# Patient Record
Sex: FEMALE | Race: WHITE | NOT HISPANIC OR LATINO
[De-identification: names, ages, dates, MRNs, and addresses within clinical notes are randomized per-mention and may not be internally consistent; named-entity substitution may affect disease eponyms.]

---

## 2017-03-10 ENCOUNTER — RX RENEWAL (OUTPATIENT)
Age: 67
End: 2017-03-10

## 2017-03-15 ENCOUNTER — RX RENEWAL (OUTPATIENT)
Age: 67
End: 2017-03-15

## 2017-03-22 ENCOUNTER — OUTPATIENT (OUTPATIENT)
Dept: OUTPATIENT SERVICES | Facility: HOSPITAL | Age: 67
LOS: 1 days | Discharge: HOME | End: 2017-03-22

## 2017-03-22 DIAGNOSIS — C50.919 MALIGNANT NEOPLASM OF UNSPECIFIED SITE OF UNSPECIFIED FEMALE BREAST: ICD-10-CM

## 2017-03-23 ENCOUNTER — APPOINTMENT (OUTPATIENT)
Dept: HEMATOLOGY ONCOLOGY | Facility: CLINIC | Age: 67
End: 2017-03-23

## 2017-03-23 VITALS
WEIGHT: 130.07 LBS | BODY MASS INDEX: 27.18 KG/M2 | HEART RATE: 68 BPM | TEMPERATURE: 97.9 F | DIASTOLIC BLOOD PRESSURE: 76 MMHG | SYSTOLIC BLOOD PRESSURE: 120 MMHG | RESPIRATION RATE: 16 BRPM

## 2017-03-23 RX ORDER — B-COMPLEX WITH VITAMIN C
TABLET ORAL
Refills: 0 | Status: ACTIVE | COMMUNITY
Start: 2017-03-23

## 2017-08-08 ENCOUNTER — EMERGENCY (EMERGENCY)
Facility: HOSPITAL | Age: 67
LOS: 0 days | Discharge: HOME | End: 2017-08-08
Attending: EMERGENCY MEDICINE | Admitting: EMERGENCY MEDICINE
Payer: MEDICARE

## 2017-08-08 VITALS
DIASTOLIC BLOOD PRESSURE: 86 MMHG | TEMPERATURE: 98 F | HEART RATE: 68 BPM | HEIGHT: 58 IN | RESPIRATION RATE: 16 BRPM | SYSTOLIC BLOOD PRESSURE: 144 MMHG | WEIGHT: 130.07 LBS | OXYGEN SATURATION: 97 %

## 2017-08-08 VITALS
TEMPERATURE: 98 F | HEART RATE: 63 BPM | RESPIRATION RATE: 16 BRPM | SYSTOLIC BLOOD PRESSURE: 119 MMHG | OXYGEN SATURATION: 99 % | DIASTOLIC BLOOD PRESSURE: 78 MMHG

## 2017-08-08 DIAGNOSIS — Z98.890 OTHER SPECIFIED POSTPROCEDURAL STATES: ICD-10-CM

## 2017-08-08 DIAGNOSIS — F90.9 ATTENTION-DEFICIT HYPERACTIVITY DISORDER, UNSPECIFIED TYPE: ICD-10-CM

## 2017-08-08 DIAGNOSIS — S43.004A UNSPECIFIED DISLOCATION OF RIGHT SHOULDER JOINT, INITIAL ENCOUNTER: ICD-10-CM
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
## 2017-08-08 DIAGNOSIS — R01.1 CARDIAC MURMUR, UNSPECIFIED: ICD-10-CM
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
## 2017-08-08 DIAGNOSIS — Z85.3 PERSONAL HISTORY OF MALIGNANT NEOPLASM OF BREAST: ICD-10-CM

## 2017-08-08 DIAGNOSIS — M25.511 PAIN IN RIGHT SHOULDER: ICD-10-CM

## 2017-08-08 DIAGNOSIS — F32.9 MAJOR DEPRESSIVE DISORDER, SINGLE EPISODE, UNSPECIFIED: ICD-10-CM

## 2017-08-08 DIAGNOSIS — Z90.721 ACQUIRED ABSENCE OF OVARIES, UNILATERAL: ICD-10-CM
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
## 2017-08-08 DIAGNOSIS — Z90.10 ACQUIRED ABSENCE OF UNSPECIFIED BREAST AND NIPPLE: ICD-10-CM

## 2017-08-08 PROCEDURE — 73060 X-RAY EXAM OF HUMERUS: CPT | Mod: 26,RT

## 2017-08-08 PROCEDURE — 23650 CLTX SHO DSLC W/MNPJ WO ANES: CPT | Mod: RT

## 2017-08-08 PROCEDURE — 99152 MOD SED SAME PHYS/QHP 5/>YRS: CPT

## 2017-08-08 PROCEDURE — 99285 EMERGENCY DEPT VISIT HI MDM: CPT | Mod: 25

## 2017-08-08 PROCEDURE — 73030 X-RAY EXAM OF SHOULDER: CPT | Mod: 26,RT

## 2017-08-08 PROCEDURE — 99153 MOD SED SAME PHYS/QHP EA: CPT

## 2017-08-08 RX ORDER — MORPHINE SULFATE 50 MG/1
4 CAPSULE, EXTENDED RELEASE ORAL ONCE
Qty: 0 | Refills: 0 | Status: DISCONTINUED | OUTPATIENT
Start: 2017-08-08 | End: 2017-08-08

## 2017-08-08 RX ORDER — ONDANSETRON 8 MG/1
4 TABLET, FILM COATED ORAL ONCE
Qty: 0 | Refills: 0 | Status: COMPLETED | OUTPATIENT
Start: 2017-08-08 | End: 2017-08-08

## 2017-08-08 RX ORDER — PROPOFOL 10 MG/ML
50 INJECTION, EMULSION INTRAVENOUS ONCE
Qty: 0 | Refills: 0 | Status: COMPLETED | OUTPATIENT
Start: 2017-08-08 | End: 2017-08-08

## 2017-08-08 RX ADMIN — PROPOFOL 50 MILLIGRAM(S): 10 INJECTION, EMULSION INTRAVENOUS at 05:05

## 2017-08-08 RX ADMIN — MORPHINE SULFATE 4 MILLIGRAM(S): 50 CAPSULE, EXTENDED RELEASE ORAL at 04:04

## 2017-08-08 RX ADMIN — ONDANSETRON 4 MILLIGRAM(S): 8 TABLET, FILM COATED ORAL at 04:04

## 2017-08-08 NOTE — ED ADULT NURSE REASSESSMENT NOTE - NS ED NURSE REASSESS COMMENT FT1
Procedure complete, pt tolerated well. Post reduction xray confirms successful closed reduction of right shoulder. AO x 3 oriented to baseline, normal breathing pattern with no difficulty.  O2 therapy not necessary. Right shoulder immobilizing sling applied

## 2017-08-08 NOTE — ED ADULT NURSE REASSESSMENT NOTE - NS ED NURSE REASSESS COMMENT FT1
Pt prepared for conscious sedation procedure. Consent signed, patient informed and all questions answered. ,  at the bedside.

## 2017-08-08 NOTE — ED ADULT TRIAGE NOTE - CHIEF COMPLAINT QUOTE
Trip and fall walking dog, -LOC. C/O right shoulder pain. No deformity noted. Arrived with sling in place by EMS. 100mcg fentanyl given by EMS.

## 2017-08-08 NOTE — ED ADULT NURSE NOTE - OBJECTIVE STATEMENT
Pt presents to ER s/p fall while walking dog. Pt reports falling onto right side and c/o right shoulder pain. Denies LOC/CP/SOB, no obvious deformity to shoulder, skin intact. Neuro intact in b/l upper extremity, good peripheral sensation. AO x 3 oriented to baseline, normal breathing pattern with no difficulty

## 2017-08-08 NOTE — ED PROVIDER NOTE - OBJECTIVE STATEMENT
67 y/o F with PMHx of heart murmur in mitral valve, breast CA BIB EMS to the ED s/p fall c/o right shoulder pain. pt was walking her dog when the dog tugged on the leash and pt tripped and fell onto her right shoulder. -FOOSH. -LOC. Pt does not take anticoagulants. NKDA. Dr. Schmidt, PMD. EMS placed sling on Pt's right shoulder and gave fentanyl for pain PTA.

## 2017-08-08 NOTE — ED PROVIDER NOTE - MUSCULOSKELETAL, MLM
Tenderness with mild deformity to right midhumerus. Normal sensation over axillary distribution. Distal pulses strong, radial and ulnar. Cap refill less than 2 seconds. NVI distally. No Cspine tenderness. Normal pelvic rock.

## 2017-08-08 NOTE — ED PROCEDURE NOTE - NS ED PERI VASCULAR NEG
capillary refill time < 2 seconds/no swelling/no cyanosis of extremity/fingers/toes warm to touch/no paresthesia

## 2017-08-08 NOTE — ED ADULT NURSE NOTE - CHPI ED SYMPTOMS NEG
no fever/no loss of consciousness/no numbness/no tingling/no confusion/no vomiting/no bleeding/no weakness

## 2017-09-13 ENCOUNTER — OUTPATIENT (OUTPATIENT)
Dept: OUTPATIENT SERVICES | Facility: HOSPITAL | Age: 67
LOS: 1 days | Discharge: HOME | End: 2017-09-13

## 2017-09-13 DIAGNOSIS — C50.919 MALIGNANT NEOPLASM OF UNSPECIFIED SITE OF UNSPECIFIED FEMALE BREAST: ICD-10-CM

## 2017-09-14 ENCOUNTER — APPOINTMENT (OUTPATIENT)
Dept: HEMATOLOGY ONCOLOGY | Facility: CLINIC | Age: 67
End: 2017-09-14
Payer: MEDICARE

## 2017-09-18 ENCOUNTER — RESULT REVIEW (OUTPATIENT)
Age: 67
End: 2017-09-18

## 2017-09-18 ENCOUNTER — APPOINTMENT (OUTPATIENT)
Dept: HEMATOLOGY ONCOLOGY | Facility: CLINIC | Age: 67
End: 2017-09-18
Payer: MEDICARE

## 2017-09-18 VITALS
DIASTOLIC BLOOD PRESSURE: 78 MMHG | RESPIRATION RATE: 16 BRPM | SYSTOLIC BLOOD PRESSURE: 118 MMHG | OXYGEN SATURATION: 97 % | TEMPERATURE: 97.8 F | WEIGHT: 130.07 LBS | HEART RATE: 74 BPM | BODY MASS INDEX: 27.18 KG/M2

## 2017-09-18 LAB
HCT VFR BLD CALC: 40.3 % — SIGNIFICANT CHANGE UP (ref 34.5–45)
HGB BLD-MCNC: 14 G/DL — SIGNIFICANT CHANGE UP (ref 11.5–15.5)
MCHC RBC-ENTMCNC: 32.2 PG — SIGNIFICANT CHANGE UP (ref 27–34)
MCHC RBC-ENTMCNC: 34.8 G/DL — SIGNIFICANT CHANGE UP (ref 32–36)
MCV RBC AUTO: 92.6 FL — SIGNIFICANT CHANGE UP (ref 80–100)
PLATELET # BLD AUTO: 216 K/UL — SIGNIFICANT CHANGE UP (ref 150–400)
RBC # BLD: 4.35 M/UL — SIGNIFICANT CHANGE UP (ref 3.8–5.2)
RBC # FLD: 12.8 % — SIGNIFICANT CHANGE UP (ref 10.3–14.5)
WBC # BLD: 4.1 K/UL — SIGNIFICANT CHANGE UP (ref 3.8–10.5)
WBC # FLD AUTO: 4.1 K/UL — SIGNIFICANT CHANGE UP (ref 3.8–10.5)

## 2017-09-18 PROCEDURE — 99215 OFFICE O/P EST HI 40 MIN: CPT

## 2017-09-19 LAB
25(OH)D3 SERPL-MCNC: 41 NG/ML
ALBUMIN SERPL ELPH-MCNC: 4.7 G/DL
ALP BLD-CCNC: 68 U/L
ALT SERPL-CCNC: 28 U/L
ANION GAP SERPL CALC-SCNC: 14 MMOL/L
AST SERPL-CCNC: 25 U/L
BILIRUB SERPL-MCNC: 0.4 MG/DL
BUN SERPL-MCNC: 17 MG/DL
CALCIUM SERPL-MCNC: 9.5 MG/DL
CHLORIDE SERPL-SCNC: 103 MMOL/L
CHOLEST SERPL-MCNC: 179 MG/DL
CHOLEST/HDLC SERPL: 2.6 RATIO
CO2 SERPL-SCNC: 25 MMOL/L
CREAT SERPL-MCNC: 0.6 MG/DL
GLUCOSE SERPL-MCNC: 120 MG/DL
HBA1C MFR BLD HPLC: 5.5 %
HDLC SERPL-MCNC: 69 MG/DL
LDLC SERPL CALC-MCNC: 93 MG/DL
POTASSIUM SERPL-SCNC: 4.7 MMOL/L
PROT SERPL-MCNC: 7.3 G/DL
SODIUM SERPL-SCNC: 142 MMOL/L
TRIGL SERPL-MCNC: 86 MG/DL
TSH SERPL-ACNC: 2.34 UIU/ML

## 2018-03-16 ENCOUNTER — OUTPATIENT (OUTPATIENT)
Dept: OUTPATIENT SERVICES | Facility: HOSPITAL | Age: 68
LOS: 1 days | Discharge: HOME | End: 2018-03-16

## 2018-03-16 DIAGNOSIS — C50.919 MALIGNANT NEOPLASM OF UNSPECIFIED SITE OF UNSPECIFIED FEMALE BREAST: ICD-10-CM

## 2018-03-20 ENCOUNTER — APPOINTMENT (OUTPATIENT)
Dept: HEMATOLOGY ONCOLOGY | Facility: CLINIC | Age: 68
End: 2018-03-20

## 2018-03-20 ENCOUNTER — APPOINTMENT (OUTPATIENT)
Dept: HEMATOLOGY ONCOLOGY | Facility: CLINIC | Age: 68
End: 2018-03-20
Payer: MEDICARE

## 2018-03-20 VITALS
TEMPERATURE: 98 F | DIASTOLIC BLOOD PRESSURE: 76 MMHG | OXYGEN SATURATION: 96 % | HEART RATE: 68 BPM | WEIGHT: 128.97 LBS | SYSTOLIC BLOOD PRESSURE: 119 MMHG | BODY MASS INDEX: 26.96 KG/M2 | RESPIRATION RATE: 16 BRPM

## 2018-03-20 DIAGNOSIS — M75.100 UNSPECIFIED ROTATOR CUFF TEAR OR RUPTURE OF UNSPECIFIED SHOULDER, NOT SPECIFIED AS TRAUMATIC: ICD-10-CM

## 2018-03-20 PROCEDURE — 99215 OFFICE O/P EST HI 40 MIN: CPT

## 2018-03-20 RX ORDER — GUAIFENESIN/PHENYLPROPANOLAMIN
500 EXPECTORANT ORAL
Refills: 0 | Status: ACTIVE | COMMUNITY
Start: 2018-03-20

## 2018-03-26 ENCOUNTER — RX RENEWAL (OUTPATIENT)
Age: 68
End: 2018-03-26

## 2018-10-12 PROBLEM — R01.1 CARDIAC MURMUR, UNSPECIFIED: Chronic | Status: ACTIVE | Noted: 2017-08-08

## 2018-10-12 PROBLEM — C50.919 MALIGNANT NEOPLASM OF UNSPECIFIED SITE OF UNSPECIFIED FEMALE BREAST: Chronic | Status: ACTIVE | Noted: 2017-08-08

## 2018-10-31 ENCOUNTER — OUTPATIENT (OUTPATIENT)
Dept: OUTPATIENT SERVICES | Facility: HOSPITAL | Age: 68
LOS: 1 days | Discharge: HOME | End: 2018-10-31

## 2018-10-31 DIAGNOSIS — C50.919 MALIGNANT NEOPLASM OF UNSPECIFIED SITE OF UNSPECIFIED FEMALE BREAST: ICD-10-CM

## 2018-11-07 NOTE — OB HISTORY
[Menarche Age: ____] : age at menarche was [unfilled] [Menopause Age: ____] : age at menopause was [unfilled] [___] : Full Term: [unfilled]

## 2018-11-08 ENCOUNTER — APPOINTMENT (OUTPATIENT)
Dept: HEMATOLOGY ONCOLOGY | Facility: CLINIC | Age: 68
End: 2018-11-08
Payer: MEDICARE

## 2018-11-08 VITALS
SYSTOLIC BLOOD PRESSURE: 124 MMHG | WEIGHT: 128.09 LBS | DIASTOLIC BLOOD PRESSURE: 78 MMHG | TEMPERATURE: 98.1 F | BODY MASS INDEX: 26.77 KG/M2 | RESPIRATION RATE: 16 BRPM | HEART RATE: 78 BPM | OXYGEN SATURATION: 97 %

## 2018-11-08 DIAGNOSIS — K63.5 POLYP OF COLON: ICD-10-CM

## 2018-11-08 PROCEDURE — 99214 OFFICE O/P EST MOD 30 MIN: CPT

## 2018-11-08 RX ORDER — HYDROCORTISONE ACETATE 0.5 %
CREAM (GRAM) TOPICAL
Refills: 0 | Status: DISCONTINUED | COMMUNITY
Start: 2017-09-18 | End: 2018-11-08

## 2018-11-08 NOTE — PHYSICAL EXAM
[Fully active, able to carry on all pre-disease performance without restriction] : Status 0 - Fully active, able to carry on all pre-disease performance without restriction [Normal] : affect appropriate [de-identified] : RRR, normal S1S2, III/VI REENA [de-identified] : No chest wall masses.

## 2018-11-08 NOTE — HISTORY OF PRESENT ILLNESS
[Disease: _____________________] : Disease: [unfilled] [T: ___] : T[unfilled] [N: ___] : N[unfilled] [M: ___] : M[unfilled] [AJCC Stage: ____] : AJCC Stage: [unfilled] [de-identified] : Left breast cancer at 58\par BRCA 2 positive\par s/p neoadjuvant chemotherapy with AC x 4, dose dense and weekly Taxol/Herceptin x 12, Herceptin for one year\par 4/23/10 left mastectomy and ALND \par s/p radiation\par Anastrozole 9/10 to 12/11 and Femara 12/11 to present\par 2/11 BSO \par 8/12 right mastectomy with bilateral TRAM flap reconstruction [de-identified] : IDC, poorly differentiated with 1/21 LN positive, ER/UT >95%, HER-2 positive [de-identified] : Cass continues to do well on letrozole with stable joint pain and stiffness. She has been having pain in her left hip which has been treated with cortisone injections and recently she had an MRI which showed osteoarthritis. Th hot flashes are less often and not a problem. She generally feels hot all the time.\par She had her right shoulder rotator cuff repair 10/2017\par Discussed risks a/w BRCA2 including melanoma, pancreatic ca, gallbladder ca. She has three sons who have not yet been tested.\par She is enjoying [facility], traveling with her . He had prostate cancer 3 years ago but is doing well.\par \par Routine Health Maintenance:\par Mammogram and breast ultrasound: N/A bilateral mastectomies\par Pap Smear: will schedule an appointment in the next few months\par Bone density:10/2018 Bone density showed T scores of -1.9 in spine, -1.9 in femoral neck and -2.1 in proximal femur\par                        02/17/16 showed T scores of -2.2 in left hip, -2.2 in femoral neck and -2.1 in spine, \par Colonoscopy: 2/16 which showed some hemorrhoids, diverticulosis and 1 benign sessile polyp in the ascending colon. 5 year follow up recommended.\par

## 2019-05-07 ENCOUNTER — OUTPATIENT (OUTPATIENT)
Dept: OUTPATIENT SERVICES | Facility: HOSPITAL | Age: 69
LOS: 1 days | Discharge: HOME | End: 2019-05-07

## 2019-05-07 DIAGNOSIS — C50.919 MALIGNANT NEOPLASM OF UNSPECIFIED SITE OF UNSPECIFIED FEMALE BREAST: ICD-10-CM

## 2019-05-09 ENCOUNTER — APPOINTMENT (OUTPATIENT)
Dept: HEMATOLOGY ONCOLOGY | Facility: CLINIC | Age: 69
End: 2019-05-09
Payer: MEDICARE

## 2019-05-09 VITALS
WEIGHT: 130 LBS | TEMPERATURE: 97.6 F | SYSTOLIC BLOOD PRESSURE: 133 MMHG | DIASTOLIC BLOOD PRESSURE: 84 MMHG | HEART RATE: 86 BPM | OXYGEN SATURATION: 98 % | BODY MASS INDEX: 27.17 KG/M2 | RESPIRATION RATE: 16 BRPM

## 2019-05-09 PROCEDURE — 99214 OFFICE O/P EST MOD 30 MIN: CPT

## 2019-05-09 NOTE — HISTORY OF PRESENT ILLNESS
[Disease: _____________________] : Disease: [unfilled] [T: ___] : T[unfilled] [M: ___] : M[unfilled] [N: ___] : N[unfilled] [AJCC Stage: ____] : AJCC Stage: [unfilled] [de-identified] : Left breast cancer at 58\par BRCA 2 positive\par s/p neoadjuvant chemotherapy with AC x 4, dose dense and weekly Taxol/Herceptin x 12, Herceptin for one year\par 4/23/10 left mastectomy and ALND \par s/p radiation\par 9/10 - 12/11 Anastrozole then changed to Femara 12/11 to present\par 2/11 BSO \par 8/12 Right mastectomy with bilateral TRAM flap reconstruction [de-identified] : IDC, poorly differentiated with 1/21 LN positive, ER/SC >95%, HER-2 positive [de-identified] : Cass continues to do well on letrozole with stable joint pain and stiffness.  The hot flashes are less often and not a problem. She generally feels hot all the time.\par Discussed risks a/w BRCA2 including melanoma, pancreatic ca, gallbladder ca. She has three sons who have not yet been tested.\par She is enjoying [employer], traveling with her . He had prostate cancer 3 years ago but is doing well.\par She had her left hip replace two weeks ago due to DJD.\par \par Routine Health Maintenance:\par Mammogram and breast ultrasound: N/A bilateral mastectomies\par Pap Smear: will schedule an appointment in the next few months\par Bone density:10/2018 Bone density showed T scores of -1.9 in spine, -1.9 in femoral neck and -2.1 in proximal femur\par                        02/17/16 showed T scores of -2.2 in left hip, -2.2 in femoral neck and -2.1 in spine, \par Colonoscopy: 2/16 which showed some hemorrhoids, diverticulosis and 1 benign sessile polyp in the ascending colon. 5 year follow up recommended.\par

## 2019-05-09 NOTE — PHYSICAL EXAM
[Fully active, able to carry on all pre-disease performance without restriction] : Status 0 - Fully active, able to carry on all pre-disease performance without restriction [Normal] : affect appropriate [de-identified] : RRR, normal S1S2, III/VI REENA [de-identified] : No chest wall masses.

## 2019-08-19 ENCOUNTER — OUTPATIENT (OUTPATIENT)
Dept: OUTPATIENT SERVICES | Facility: HOSPITAL | Age: 69
LOS: 1 days | Discharge: HOME | End: 2019-08-19

## 2019-08-19 DIAGNOSIS — C50.919 MALIGNANT NEOPLASM OF UNSPECIFIED SITE OF UNSPECIFIED FEMALE BREAST: ICD-10-CM

## 2019-08-20 ENCOUNTER — APPOINTMENT (OUTPATIENT)
Dept: HEMATOLOGY ONCOLOGY | Facility: CLINIC | Age: 69
End: 2019-08-20
Payer: MEDICARE

## 2019-08-20 VITALS
WEIGHT: 128.53 LBS | DIASTOLIC BLOOD PRESSURE: 87 MMHG | BODY MASS INDEX: 26.86 KG/M2 | HEART RATE: 81 BPM | TEMPERATURE: 98.1 F | RESPIRATION RATE: 16 BRPM | SYSTOLIC BLOOD PRESSURE: 137 MMHG | OXYGEN SATURATION: 97 %

## 2019-08-20 DIAGNOSIS — K43.9 VENTRAL HERNIA W/OUT OBSTRUCTION OR GANGRENE: ICD-10-CM

## 2019-08-20 PROCEDURE — 99214 OFFICE O/P EST MOD 30 MIN: CPT

## 2019-08-20 NOTE — PHYSICAL EXAM
[Fully active, able to carry on all pre-disease performance without restriction] : Status 0 - Fully active, able to carry on all pre-disease performance without restriction [Normal] : affect appropriate [de-identified] : RRR, normal S1S2, III/VI REENA [de-identified] : +Epigastric hernia/recti diastasis, prominent with pt bearing down or cough, reducible [de-identified] : No chest wall masses.

## 2019-08-20 NOTE — HISTORY OF PRESENT ILLNESS
[Disease: _____________________] : Disease: [unfilled] [T: ___] : T[unfilled] [N: ___] : N[unfilled] [M: ___] : M[unfilled] [AJCC Stage: ____] : AJCC Stage: [unfilled] [de-identified] : Left breast cancer at 58\par BRCA 2 positive\par s/p neoadjuvant chemotherapy with AC x 4, dose dense and weekly Taxol/Herceptin x 12, Herceptin for one year\par 4/23/10 left mastectomy and ALND \par s/p radiation\par 9/10 - 12/11 Anastrozole then changed to Femara 12/11 to present\par 2/11 BSO \par 8/12 Right mastectomy with bilateral TRAM flap reconstruction [de-identified] : IDC, poorly differentiated with 1/21 LN positive, ER/MD >95%, HER-2 positive [de-identified] : Cass continues to do well on letrozole which she has been on since 12/11 and prior to that was on Anastrozole for 1 year,  with stable joint pain and stiffness.  \par She is s/p Left hip replacement in 4/19, with improvement in pain, and balance. She also reports taking Tumeric since before her hip replacement and was already having improvement in her joint pain and stiffness. She is no longer having any hot flashes over the past few months. \par The only complaint she has today is that a few days ago she incidentally palpable a lump in the epigastric region with no pain or discomfort. \par Discussed risks a/w BRCA2 including melanoma, pancreatic ca, gallbladder ca. She has three sons who have not yet been tested.\par She is enjoying [facility], traveling with her . He had prostate cancer 4 years ago but is doing well.\par \par Routine Health Maintenance:\par Mammogram and breast ultrasound: N/A bilateral mastectomies\par Pap Smear: will schedule an appointment in the next few months\par Bone density:10/2018 Bone density showed T scores of -1.9 in spine, -1.9 in femoral neck and -2.1 in proximal femur\par                        02/17/16 showed T scores of -2.2 in left hip, -2.2 in femoral neck and -2.1 in spine, \par Colonoscopy: 2/16 which showed some hemorrhoids, diverticulosis and 1 benign sessile polyp in the ascending colon. 5 year follow up recommended.\par

## 2019-12-18 ENCOUNTER — RX RENEWAL (OUTPATIENT)
Age: 69
End: 2019-12-18

## 2020-02-13 ENCOUNTER — OUTPATIENT (OUTPATIENT)
Dept: OUTPATIENT SERVICES | Facility: HOSPITAL | Age: 70
LOS: 1 days | Discharge: HOME | End: 2020-02-13

## 2020-02-13 DIAGNOSIS — C50.919 MALIGNANT NEOPLASM OF UNSPECIFIED SITE OF UNSPECIFIED FEMALE BREAST: ICD-10-CM

## 2020-02-18 ENCOUNTER — APPOINTMENT (OUTPATIENT)
Dept: HEMATOLOGY ONCOLOGY | Facility: CLINIC | Age: 70
End: 2020-02-18
Payer: MEDICARE

## 2020-02-18 VITALS
OXYGEN SATURATION: 96 % | TEMPERATURE: 98.1 F | RESPIRATION RATE: 15 BRPM | WEIGHT: 130.07 LBS | BODY MASS INDEX: 27.18 KG/M2 | SYSTOLIC BLOOD PRESSURE: 148 MMHG | DIASTOLIC BLOOD PRESSURE: 88 MMHG | HEART RATE: 76 BPM

## 2020-02-18 DIAGNOSIS — M25.50 PAIN IN UNSPECIFIED JOINT: ICD-10-CM

## 2020-02-18 PROCEDURE — 99214 OFFICE O/P EST MOD 30 MIN: CPT

## 2020-02-18 RX ORDER — GLUCOSAMINE HCL/CHONDROITIN SU 500-400 MG
500-400 CAPSULE ORAL
Refills: 0 | Status: ACTIVE | COMMUNITY
Start: 2020-02-18

## 2020-02-21 NOTE — PHYSICAL EXAM
[Fully active, able to carry on all pre-disease performance without restriction] : Status 0 - Fully active, able to carry on all pre-disease performance without restriction [Normal] : affect appropriate [de-identified] : RRR, normal S1S2, III/VI REENA [de-identified] : +Epigastric hernia/recti diastasis, prominent with pt bearing down or cough, reducible [de-identified] : No chest wall masses.

## 2020-02-21 NOTE — HISTORY OF PRESENT ILLNESS
[T: ___] : T[unfilled] [Disease: _____________________] : Disease: [unfilled] [N: ___] : N[unfilled] [M: ___] : M[unfilled] [AJCC Stage: ____] : AJCC Stage: [unfilled] [de-identified] : Left breast cancer at 58\par BRCA 2 positive\par s/p neoadjuvant chemotherapy with AC x 4, dose dense and weekly Taxol/Herceptin x 12, Herceptin for one year\par 4/23/10 left mastectomy and ALND \par s/p radiation\par 9/10 - 12/11 Anastrozole then changed to Femara 12/11 to present\par 2/11 BSO \par 8/12 Right mastectomy with bilateral TRAM flap reconstruction [de-identified] : IDC, poorly differentiated with 1/21 LN positive, ER/ID >95%, HER-2 positive [de-identified] : Cass continues to do well on letrozole which she has been on since 12/11, with stable joint pain and stiffness. She rarely gets hot flashes now. \par She is going to see Dr. Mckeon in Urology as she had microscopic hematuria.\par She is s/p Left hip replacement in 4/19, with improvement in pain, and balance. She also reports taking Turmeric since before her hip replacement and was already having improvement in her joint pain and stiffness. \par Discussed risks a/w BRCA2 including melanoma, pancreatic ca, gallbladder ca. She has three sons who have not yet been tested.\par She is enjoying residential, traveling with her . He had prostate cancer 4 years ago but is doing well.\par \par Routine Health Maintenance:\par Mammogram and breast ultrasound: N/A bilateral mastectomies\par Pap Smear: will schedule an appointment as needed; no longer sexually active\par Bone density:10/2018 Bone density showed T scores of -1.9 in spine, -1.9 in femoral neck and -2.1 in proximal femur\par                        02/17/16 showed T scores of -2.2 in left hip, -2.2 in femoral neck and -2.1 in spine, \par Colonoscopy: 2/16 which showed some hemorrhoids, diverticulosis and 1 benign sessile polyp in the ascending colon. 5 year follow up recommended.\par Dermatology:\par

## 2020-02-25 ENCOUNTER — APPOINTMENT (OUTPATIENT)
Dept: UROLOGY | Facility: CLINIC | Age: 70
End: 2020-02-25
Payer: MEDICARE

## 2020-02-25 DIAGNOSIS — R35.0 FREQUENCY OF MICTURITION: ICD-10-CM

## 2020-02-25 PROCEDURE — 99204 OFFICE O/P NEW MOD 45 MIN: CPT

## 2020-02-25 PROCEDURE — 99214 OFFICE O/P EST MOD 30 MIN: CPT

## 2020-02-25 NOTE — PHYSICAL EXAM
[General Appearance - Well Developed] : well developed [Normal Appearance] : normal appearance [General Appearance - Well Nourished] : well nourished [General Appearance - In No Acute Distress] : no acute distress [Well Groomed] : well groomed [Respiration, Rhythm And Depth] : normal respiratory rhythm and effort [Edema] : no peripheral edema [Abdomen Tenderness] : non-tender [Exaggerated Use Of Accessory Muscles For Inspiration] : no accessory muscle use [Abdomen Soft] : soft [Normal Station and Gait] : the gait and station were normal for the patient's age [Urinary Bladder Findings] : the bladder was normal on palpation [Costovertebral Angle Tenderness] : no ~M costovertebral angle tenderness [] : no rash [No Focal Deficits] : no focal deficits [Oriented To Time, Place, And Person] : oriented to person, place, and time [Affect] : the affect was normal [Not Anxious] : not anxious [Mood] : the mood was normal [No Palpable Adenopathy] : no palpable adenopathy

## 2020-02-26 LAB
APPEARANCE: CLEAR
BACTERIA: NEGATIVE
BILIRUBIN URINE: NEGATIVE
BLOOD URINE: NORMAL
CALCIUM OXALATE CRYSTALS: ABNORMAL
COLOR: NORMAL
GLUCOSE QUALITATIVE U: NEGATIVE
HYALINE CASTS: 0 /LPF
KETONES URINE: NEGATIVE
LEUKOCYTE ESTERASE URINE: NEGATIVE
MICROSCOPIC-UA: NORMAL
NITRITE URINE: NEGATIVE
PH URINE: 6
PROTEIN URINE: NEGATIVE
RED BLOOD CELLS URINE: 1 /HPF
SPECIFIC GRAVITY URINE: 1.02
SQUAMOUS EPITHELIAL CELLS: 5 /HPF
UROBILINOGEN URINE: NORMAL
WHITE BLOOD CELLS URINE: 2 /HPF

## 2020-02-27 LAB — URINE CYTOLOGY: NORMAL

## 2020-08-27 ENCOUNTER — APPOINTMENT (OUTPATIENT)
Dept: UROLOGY | Facility: CLINIC | Age: 70
End: 2020-08-27
Payer: MEDICARE

## 2020-08-27 VITALS — TEMPERATURE: 98.3 F

## 2020-08-27 DIAGNOSIS — N28.1 CYST OF KIDNEY, ACQUIRED: ICD-10-CM

## 2020-08-27 DIAGNOSIS — R31.29 OTHER MICROSCOPIC HEMATURIA: ICD-10-CM

## 2020-08-27 PROCEDURE — 99213 OFFICE O/P EST LOW 20 MIN: CPT

## 2020-08-27 NOTE — PHYSICAL EXAM
[General Appearance - Well Nourished] : well nourished [General Appearance - Well Developed] : well developed [Normal Appearance] : normal appearance [Well Groomed] : well groomed [General Appearance - In No Acute Distress] : no acute distress [Abdomen Soft] : soft [Urinary Bladder Findings] : the bladder was normal on palpation [Abdomen Tenderness] : non-tender [Costovertebral Angle Tenderness] : no ~M costovertebral angle tenderness [Edema] : no peripheral edema [Respiration, Rhythm And Depth] : normal respiratory rhythm and effort [] : no respiratory distress [Oriented To Time, Place, And Person] : oriented to person, place, and time [Exaggerated Use Of Accessory Muscles For Inspiration] : no accessory muscle use [Affect] : the affect was normal [Mood] : the mood was normal [Normal Station and Gait] : the gait and station were normal for the patient's age [No Focal Deficits] : no focal deficits [Not Anxious] : not anxious [No Palpable Adenopathy] : no palpable adenopathy

## 2020-08-28 ENCOUNTER — OUTPATIENT (OUTPATIENT)
Dept: OUTPATIENT SERVICES | Facility: HOSPITAL | Age: 70
LOS: 1 days | Discharge: HOME | End: 2020-08-28

## 2020-08-28 DIAGNOSIS — C50.919 MALIGNANT NEOPLASM OF UNSPECIFIED SITE OF UNSPECIFIED FEMALE BREAST: ICD-10-CM

## 2020-08-28 LAB
APPEARANCE: CLEAR
BACTERIA: NEGATIVE
BILIRUBIN URINE: NEGATIVE
BLOOD URINE: NEGATIVE
COLOR: YELLOW
GLUCOSE QUALITATIVE U: NEGATIVE
HYALINE CASTS: 2 /LPF
KETONES URINE: NEGATIVE
LEUKOCYTE ESTERASE URINE: ABNORMAL
MICROSCOPIC-UA: NORMAL
NITRITE URINE: NEGATIVE
PH URINE: 6
PROTEIN URINE: NORMAL
RED BLOOD CELLS URINE: 8 /HPF
SPECIFIC GRAVITY URINE: 1.03
SQUAMOUS EPITHELIAL CELLS: 5 /HPF
UROBILINOGEN URINE: NORMAL
WHITE BLOOD CELLS URINE: 2 /HPF

## 2020-08-29 LAB — URINE CYTOLOGY: NORMAL

## 2020-09-03 ENCOUNTER — RESULT REVIEW (OUTPATIENT)
Age: 70
End: 2020-09-03

## 2020-09-03 ENCOUNTER — APPOINTMENT (OUTPATIENT)
Dept: HEMATOLOGY ONCOLOGY | Facility: CLINIC | Age: 70
End: 2020-09-03
Payer: MEDICARE

## 2020-09-03 VITALS
OXYGEN SATURATION: 96 % | DIASTOLIC BLOOD PRESSURE: 89 MMHG | HEART RATE: 78 BPM | SYSTOLIC BLOOD PRESSURE: 137 MMHG | WEIGHT: 127.87 LBS | BODY MASS INDEX: 26.84 KG/M2 | TEMPERATURE: 98.3 F | HEIGHT: 57.87 IN | RESPIRATION RATE: 15 BRPM

## 2020-09-03 DIAGNOSIS — Z15.01 GENETIC SUSCEPTIBILITY TO MALIGNANT NEOPLASM OF BREAST: ICD-10-CM

## 2020-09-03 DIAGNOSIS — R79.89 OTHER SPECIFIED ABNORMAL FINDINGS OF BLOOD CHEMISTRY: ICD-10-CM

## 2020-09-03 DIAGNOSIS — C50.912 MALIGNANT NEOPLASM OF UNSPECIFIED SITE OF LEFT FEMALE BREAST: ICD-10-CM

## 2020-09-03 DIAGNOSIS — Z15.09 GENETIC SUSCEPTIBILITY TO MALIGNANT NEOPLASM OF BREAST: ICD-10-CM

## 2020-09-03 DIAGNOSIS — M81.0 AGE-RELATED OSTEOPOROSIS W/OUT CURRENT PATHOLOGICAL FRACTURE: ICD-10-CM

## 2020-09-03 LAB
BASOPHILS # BLD AUTO: 0.05 K/UL — SIGNIFICANT CHANGE UP (ref 0–0.2)
BASOPHILS NFR BLD AUTO: 1.2 % — SIGNIFICANT CHANGE UP (ref 0–2)
EOSINOPHIL # BLD AUTO: 0.11 K/UL — SIGNIFICANT CHANGE UP (ref 0–0.5)
EOSINOPHIL NFR BLD AUTO: 2.6 % — SIGNIFICANT CHANGE UP (ref 0–6)
HCT VFR BLD CALC: 40.8 % — SIGNIFICANT CHANGE UP (ref 34.5–45)
HGB BLD-MCNC: 13.3 G/DL — SIGNIFICANT CHANGE UP (ref 11.5–15.5)
IMM GRANULOCYTES NFR BLD AUTO: 0.7 % — SIGNIFICANT CHANGE UP (ref 0–1.5)
LYMPHOCYTES # BLD AUTO: 1.13 K/UL — SIGNIFICANT CHANGE UP (ref 1–3.3)
LYMPHOCYTES # BLD AUTO: 26.5 % — SIGNIFICANT CHANGE UP (ref 13–44)
MCHC RBC-ENTMCNC: 30.9 PG — SIGNIFICANT CHANGE UP (ref 27–34)
MCHC RBC-ENTMCNC: 32.6 GM/DL — SIGNIFICANT CHANGE UP (ref 32–36)
MCV RBC AUTO: 94.7 FL — SIGNIFICANT CHANGE UP (ref 80–100)
MONOCYTES # BLD AUTO: 0.3 K/UL — SIGNIFICANT CHANGE UP (ref 0–0.9)
MONOCYTES NFR BLD AUTO: 7 % — SIGNIFICANT CHANGE UP (ref 2–14)
NEUTROPHILS # BLD AUTO: 2.65 K/UL — SIGNIFICANT CHANGE UP (ref 1.8–7.4)
NEUTROPHILS NFR BLD AUTO: 62 % — SIGNIFICANT CHANGE UP (ref 43–77)
NRBC # BLD: 0 /100 WBCS — SIGNIFICANT CHANGE UP (ref 0–0)
PLATELET # BLD AUTO: 207 K/UL — SIGNIFICANT CHANGE UP (ref 150–400)
RBC # BLD: 4.31 M/UL — SIGNIFICANT CHANGE UP (ref 3.8–5.2)
RBC # FLD: 12.2 % — SIGNIFICANT CHANGE UP (ref 10.3–14.5)
WBC # BLD: 4.27 K/UL — SIGNIFICANT CHANGE UP (ref 3.8–10.5)
WBC # FLD AUTO: 4.27 K/UL — SIGNIFICANT CHANGE UP (ref 3.8–10.5)

## 2020-09-03 PROCEDURE — 99214 OFFICE O/P EST MOD 30 MIN: CPT

## 2020-09-04 LAB
25(OH)D3 SERPL-MCNC: 33 NG/ML
ALBUMIN SERPL ELPH-MCNC: 4.7 G/DL
ALP BLD-CCNC: 56 U/L
ALT SERPL-CCNC: 37 U/L
ANION GAP SERPL CALC-SCNC: 10 MMOL/L
APTT BLD: 30.8 SEC
AST SERPL-CCNC: 30 U/L
BILIRUB SERPL-MCNC: 0.5 MG/DL
BUN SERPL-MCNC: 17 MG/DL
CALCIUM SERPL-MCNC: 9.6 MG/DL
CHLORIDE SERPL-SCNC: 103 MMOL/L
CHOLEST SERPL-MCNC: 183 MG/DL
CHOLEST/HDLC SERPL: 2.7 RATIO
CO2 SERPL-SCNC: 28 MMOL/L
CREAT SERPL-MCNC: 0.52 MG/DL
GLUCOSE SERPL-MCNC: 101 MG/DL
HDLC SERPL-MCNC: 69 MG/DL
INR PPP: 0.92 RATIO
LDLC SERPL CALC-MCNC: 94 MG/DL
POTASSIUM SERPL-SCNC: 4.5 MMOL/L
PROT SERPL-MCNC: 6.7 G/DL
PT BLD: 10.9 SEC
SODIUM SERPL-SCNC: 141 MMOL/L
TRIGL SERPL-MCNC: 101 MG/DL
TSH SERPL-ACNC: 1.7 UIU/ML

## 2020-09-06 NOTE — PHYSICAL EXAM
[Fully active, able to carry on all pre-disease performance without restriction] : Status 0 - Fully active, able to carry on all pre-disease performance without restriction [Normal] : normoactive bowel sounds, soft and nontender, no hepatosplenomegaly or masses appreciated [de-identified] : RRR, normal S1S2, III/VI REENA [de-identified] : No chest wall masses.

## 2020-09-06 NOTE — HISTORY OF PRESENT ILLNESS
[Disease: _____________________] : Disease: [unfilled] [T: ___] : T[unfilled] [N: ___] : N[unfilled] [M: ___] : M[unfilled] [AJCC Stage: ____] : AJCC Stage: [unfilled] [de-identified] : Left breast cancer at 58\par BRCA 2 positive\par s/p neoadjuvant chemotherapy with AC x 4, dose dense and weekly Taxol/Herceptin x 12, Herceptin for one year\par 4/23/10 left mastectomy and ALND \par s/p radiation\par 9/10 - 12/11 Anastrozole then changed to Femara 12/11 - 9/2020\par 2/11 BSO \par 8/12 Right mastectomy with bilateral TRAM flap reconstruction [de-identified] : IDC, poorly differentiated with 1/21 LN positive, ER/SC >95%, HER-2 positive [de-identified] : Cass completes 10 years of endocrine therapy with an AI this month and will be stopping treatment.\par She developed vaginal bleeding about a month ago and she is seeing a UroGyn on 9/8 and her GYN on 9/18 to evaluate. She reports she has a cystocele.\par She previously saw Dr. Mckeon in Urology for microscopic hematuria which resolved.\par She is s/p Left hip replacement in 4/19, with improvement in pain, and balance. She also reports taking Turmeric since before her hip replacement and was already having improvement in her joint pain and stiffness. \par Discussed risks a/w BRCA2 including melanoma, pancreatic ca, gallbladder ca. She has three sons who have not yet been tested.\par \par Routine Health Maintenance:\par Mammogram and breast ultrasound: N/A bilateral mastectomies\par Pap Smear: will schedule an appointment as needed; no longer sexually active\par Bone density:10/2018 Bone density showed T scores of -1.9 in spine, -1.9 in femoral neck and -2.1 in proximal femur\par                        02/17/16 showed T scores of -2.2 in left hip, -2.2 in femoral neck and -2.1 in spine, \par Colonoscopy: 2/16 which showed some hemorrhoids, diverticulosis and 1 benign sessile polyp in the ascending colon. 5 year follow up recommended.\par Dermatology: 2019\par

## 2020-09-08 ENCOUNTER — TRANSCRIPTION ENCOUNTER (OUTPATIENT)
Age: 70
End: 2020-09-08

## 2020-09-09 ENCOUNTER — APPOINTMENT (OUTPATIENT)
Dept: UROGYNECOLOGY | Facility: CLINIC | Age: 70
End: 2020-09-09
Payer: MEDICARE

## 2020-09-09 VITALS
DIASTOLIC BLOOD PRESSURE: 80 MMHG | HEART RATE: 78 BPM | SYSTOLIC BLOOD PRESSURE: 126 MMHG | BODY MASS INDEX: 27.4 KG/M2 | HEIGHT: 57 IN | WEIGHT: 127 LBS

## 2020-09-09 DIAGNOSIS — N81.11 CYSTOCELE, MIDLINE: ICD-10-CM

## 2020-09-09 DIAGNOSIS — N81.2 INCOMPLETE UTEROVAGINAL PROLAPSE: ICD-10-CM

## 2020-09-09 DIAGNOSIS — N95.0 POSTMENOPAUSAL BLEEDING: ICD-10-CM

## 2020-09-09 DIAGNOSIS — N39.41 URGE INCONTINENCE: ICD-10-CM

## 2020-09-09 DIAGNOSIS — N36.41 HYPERMOBILITY OF URETHRA: ICD-10-CM

## 2020-09-09 LAB
BILIRUB UR QL STRIP: NORMAL
CLARITY UR: CLEAR
COLLECTION METHOD: NORMAL
GLUCOSE UR-MCNC: NORMAL
HCG UR QL: 0.2 EU/DL
HGB UR QL STRIP.AUTO: NORMAL
KETONES UR-MCNC: NORMAL
LEUKOCYTE ESTERASE UR QL STRIP: NORMAL
NITRITE UR QL STRIP: NORMAL
PH UR STRIP: 6
PROT UR STRIP-MCNC: NORMAL
SP GR UR STRIP: 1.02

## 2020-09-09 PROCEDURE — 51701 INSERT BLADDER CATHETER: CPT

## 2020-09-09 PROCEDURE — 81003 URINALYSIS AUTO W/O SCOPE: CPT | Mod: QW

## 2020-09-09 PROCEDURE — 99204 OFFICE O/P NEW MOD 45 MIN: CPT | Mod: 25

## 2020-09-09 NOTE — HISTORY OF PRESENT ILLNESS
[Unable To Restrain Bowel Movement] : no [Feelings Of Urinary Urgency] : mild [Urinary Frequency] : no [] : months ago [FreeTextEntry3] : has in the past  [FreeTextEntry4] : daily  [de-identified] : 1.5 [FreeTextEntry5] : daily - worse in the last 6 mos [de-identified] : several  [de-identified] : 1.5 [de-identified] : daily  [de-identified] : few [de-identified] : sometimes [de-identified] : few [de-identified] : sometimes [de-identified] : not sexually active [de-identified] : 0-1 [FreeTextEntry1] : Patient reports seeing Dr. Mckeon for a history of microscopic hematuria

## 2020-09-09 NOTE — PHYSICAL EXAM
[Chaperone Present] : A chaperone was present in the examining room during all aspects of the physical examination [No Acute Distress] : in no acute distress [Well developed] : well developed [Well Nourished] : ~L well nourished [Oriented x3] : oriented to person, place, and time [Bulbocavernous] : bulbocavernous was present [Anal Reflex] : the anal reflex was present [Scar] : a scar was noted [Suprapubic] : in the suprapubic area [Vulvar Atrophy] : vulvar atrophy [Normal Appearance] : general appearance was normal [Cystocele] : a cystocele [Uterine Prolapse] : uterine prolapse [Aa ____] : Aa [unfilled] [C ____] : C [unfilled] [Ba ____] : Ba [unfilled] [GH ____] : GH [unfilled] [TVL ____] : TVL  [unfilled] [Bp ____] : Bp [unfilled] [Ap ____] : Ap [unfilled] [D ____] : D [unfilled] [Normal] : normal [Post Void Residual ____ml] : post void residual was [unfilled] ml [Normal rectal exam] : was normal [Tenderness] : ~T no ~M abdominal tenderness observed [Distended] : not distended [H/Smegaly] : no hepatosplenomegaly [FreeTextEntry3] : + hypermobility

## 2020-09-09 NOTE — DISCUSSION/SUMMARY
[FreeTextEntry1] : pt has a GYN appointment on 9/18. \par I reviewed the above findings with the patient with visual illustrations. Treatment options for the prolapse were discussed and included doing nothing, Kegel exercises and behavioral modification, a pessary, or surgical correction.Surgically we discussed the abdominal vs the vaginal routes. Abdominally we discussed a hysterectomy and a sacral colpopexy   laparoscopically and robotically.  Vaginally we discussed a vaginal hysterectomy, uterosacral suspension, and anterior/posterior repair. Surgically we discussed hysteropexy as well as the use of biologics. We discussed the postmenopausal bleeding, and I've asked her to go for a transvaginal ultrasound. She would like to think about her treatment options. I've asked to followup in the office in approximately 2 months. IUGA, patient information on pelvic organ prolapse and overactive bladder was given to her. All questions were answered.\par

## 2020-09-12 LAB — BACTERIA UR CULT: NORMAL

## 2020-09-14 LAB
APPEARANCE: CLEAR
BACTERIA: NEGATIVE
BILIRUBIN URINE: NEGATIVE
BLOOD URINE: NEGATIVE
COLOR: YELLOW
GLUCOSE QUALITATIVE U: NEGATIVE
HYALINE CASTS: 0 /LPF
KETONES URINE: NEGATIVE
LEUKOCYTE ESTERASE URINE: NEGATIVE
MICROSCOPIC-UA: NORMAL
NITRITE URINE: NEGATIVE
PH URINE: 6.5
PROTEIN URINE: NEGATIVE
RED BLOOD CELLS URINE: 7 /HPF
SPECIFIC GRAVITY URINE: 1.02
SQUAMOUS EPITHELIAL CELLS: 0 /HPF
UROBILINOGEN URINE: NORMAL
WHITE BLOOD CELLS URINE: 1 /HPF

## 2020-09-22 ENCOUNTER — APPOINTMENT (OUTPATIENT)
Dept: ULTRASOUND IMAGING | Facility: CLINIC | Age: 70
End: 2020-09-22
Payer: MEDICARE

## 2020-09-22 ENCOUNTER — OUTPATIENT (OUTPATIENT)
Dept: OUTPATIENT SERVICES | Facility: HOSPITAL | Age: 70
LOS: 1 days | End: 2020-09-22
Payer: COMMERCIAL

## 2020-09-22 DIAGNOSIS — Z00.8 ENCOUNTER FOR OTHER GENERAL EXAMINATION: ICD-10-CM

## 2020-09-22 PROCEDURE — 76830 TRANSVAGINAL US NON-OB: CPT | Mod: 26

## 2020-09-22 PROCEDURE — 76830 TRANSVAGINAL US NON-OB: CPT

## 2020-11-11 ENCOUNTER — TRANSCRIPTION ENCOUNTER (OUTPATIENT)
Age: 70
End: 2020-11-11

## 2020-11-11 ENCOUNTER — APPOINTMENT (OUTPATIENT)
Dept: UROGYNECOLOGY | Facility: CLINIC | Age: 70
End: 2020-11-11

## 2021-08-16 ENCOUNTER — TRANSCRIPTION ENCOUNTER (OUTPATIENT)
Age: 71
End: 2021-08-16

## 2023-05-22 ENCOUNTER — OFFICE (OUTPATIENT)
Dept: URBAN - METROPOLITAN AREA CLINIC 12 | Facility: CLINIC | Age: 73
Setting detail: OPHTHALMOLOGY
End: 2023-05-22
Payer: MEDICARE

## 2023-05-22 DIAGNOSIS — H18.513: ICD-10-CM

## 2023-05-22 DIAGNOSIS — H16.223: ICD-10-CM

## 2023-05-22 DIAGNOSIS — H26.493: ICD-10-CM

## 2023-05-22 DIAGNOSIS — H40.013: ICD-10-CM

## 2023-05-22 PROCEDURE — 92083 EXTENDED VISUAL FIELD XM: CPT | Performed by: OPTOMETRIST

## 2023-05-22 PROCEDURE — 92014 COMPRE OPH EXAM EST PT 1/>: CPT | Performed by: OPTOMETRIST

## 2023-05-22 ASSESSMENT — REFRACTION_AUTOREFRACTION
OD_AXIS: 042
OS_SPHERE: PLANO
OS_AXIS: 105
OS_CYLINDER: -0.75
OD_CYLINDER: -0.25
OD_SPHERE: PLANO

## 2023-05-22 ASSESSMENT — REFRACTION_MANIFEST
OD_SPHERE: -0.25
OS_SPHERE: +0.25
OD_VA1: 20/30-
OD_AXIS: 030
OS_VA1: 20/25
OS_CYLINDER: -0.75
OD_CYLINDER: -0.50
OS_AXIS: 100

## 2023-05-22 ASSESSMENT — REFRACTION_CURRENTRX
OS_VPRISM_DIRECTION: SV
OD_ADD: +2.50
OS_OVR_VA: 20/
OD_OVR_VA: 20/
OS_ADD: +2.50
OD_VPRISM_DIRECTION: SV

## 2023-05-22 ASSESSMENT — VISUAL ACUITY
OD_BCVA: 20/40-1
OS_BCVA: 20/50-1

## 2023-05-22 ASSESSMENT — PACHYMETRY
OS_CT_CORRECTION: -6
OS_CT_UM: 625
OD_CT_UM: 622
OD_CT_CORRECTION: -6

## 2023-05-22 ASSESSMENT — SPHEQUIV_DERIVED
OD_SPHEQUIV: -0.5
OS_SPHEQUIV: -0.125

## 2023-05-22 ASSESSMENT — CONFRONTATIONAL VISUAL FIELD TEST (CVF)
OS_FINDINGS: FULL
OD_FINDINGS: FULL

## 2023-05-22 ASSESSMENT — KERATOMETRY
OD_AXISANGLE_DEGREES: 103
OS_K1POWER_DIOPTERS: 43.50
OS_K2POWER_DIOPTERS: 44.00
OS_AXISANGLE_DEGREES: 027
OD_K2POWER_DIOPTERS: 44.00
OD_K1POWER_DIOPTERS: 43.50

## 2023-05-22 ASSESSMENT — CORNEAL DYSTROPHY - POSTERIOR
OD_POSTERIORDYSTROPHY: GUTTATA
OS_POSTERIORDYSTROPHY: GUTTATA

## 2023-05-22 ASSESSMENT — AXIALLENGTH_DERIVED
OS_AL: 23.5489
OD_AL: 23.6953

## 2023-05-22 ASSESSMENT — TEAR BREAK UP TIME (TBUT)
OD_TBUT: T
OS_TBUT: T

## 2023-05-22 ASSESSMENT — TONOMETRY
OD_IOP_MMHG: 16
OS_IOP_MMHG: 17

## 2023-11-22 ENCOUNTER — OFFICE (OUTPATIENT)
Dept: URBAN - METROPOLITAN AREA CLINIC 12 | Facility: CLINIC | Age: 73
Setting detail: OPHTHALMOLOGY
End: 2023-11-22
Payer: MEDICARE

## 2023-11-22 DIAGNOSIS — H40.013: ICD-10-CM

## 2023-11-22 DIAGNOSIS — H16.223: ICD-10-CM

## 2023-11-22 DIAGNOSIS — H18.513: ICD-10-CM

## 2023-11-22 PROCEDURE — 92286 ANT SGM IMG I&R SPECLR MIC: CPT | Performed by: OPTOMETRIST

## 2023-11-22 PROCEDURE — 99213 OFFICE O/P EST LOW 20 MIN: CPT | Performed by: OPTOMETRIST

## 2023-11-22 PROCEDURE — 92250 FUNDUS PHOTOGRAPHY W/I&R: CPT | Performed by: OPTOMETRIST

## 2023-11-22 ASSESSMENT — REFRACTION_AUTOREFRACTION
OD_AXIS: 038
OS_SPHERE: +0.75
OS_CYLINDER: -1.25
OD_SPHERE: PLANO
OD_CYLINDER: -0.25
OS_AXIS: 104

## 2023-11-22 ASSESSMENT — CONFRONTATIONAL VISUAL FIELD TEST (CVF)
OS_FINDINGS: FULL
OD_FINDINGS: FULL

## 2023-11-22 ASSESSMENT — REFRACTION_MANIFEST
OS_AXIS: 100
OD_CYLINDER: -0.50
OD_SPHERE: -0.25
OD_VA1: 20/30-
OS_VA1: 20/25
OS_CYLINDER: -0.75
OD_AXIS: 030
OS_SPHERE: +0.25

## 2023-11-22 ASSESSMENT — REFRACTION_CURRENTRX
OS_ADD: +2.50
OD_ADD: +2.50
OD_VPRISM_DIRECTION: SV
OS_VPRISM_DIRECTION: SV
OD_OVR_VA: 20/
OS_OVR_VA: 20/

## 2023-11-22 ASSESSMENT — SPHEQUIV_DERIVED
OS_SPHEQUIV: -0.125
OS_SPHEQUIV: 0.125
OD_SPHEQUIV: -0.5

## 2023-11-22 ASSESSMENT — TEAR BREAK UP TIME (TBUT)
OS_TBUT: T
OD_TBUT: T

## 2023-11-22 ASSESSMENT — CORNEAL DYSTROPHY - POSTERIOR
OS_POSTERIORDYSTROPHY: GUTTATA
OD_POSTERIORDYSTROPHY: GUTTATA

## 2024-04-01 NOTE — OB HISTORY
[Menarche Age: ____] : age at menarche was [unfilled] [Menopause Age: ____] : age at menopause was [unfilled] [___] : Full Term: [unfilled] No

## 2024-08-12 ENCOUNTER — OFFICE (OUTPATIENT)
Dept: URBAN - METROPOLITAN AREA CLINIC 12 | Facility: CLINIC | Age: 74
Setting detail: OPHTHALMOLOGY
End: 2024-08-12
Payer: MEDICARE

## 2024-08-12 DIAGNOSIS — H18.513: ICD-10-CM

## 2024-08-12 DIAGNOSIS — H16.223: ICD-10-CM

## 2024-08-12 DIAGNOSIS — H52.4: ICD-10-CM

## 2024-08-12 DIAGNOSIS — H40.013: ICD-10-CM

## 2024-08-12 PROCEDURE — 92083 EXTENDED VISUAL FIELD XM: CPT | Performed by: OPTOMETRIST

## 2024-08-12 PROCEDURE — 92133 CPTRZD OPH DX IMG PST SGM ON: CPT | Performed by: OPTOMETRIST

## 2024-08-12 PROCEDURE — 92014 COMPRE OPH EXAM EST PT 1/>: CPT | Performed by: OPTOMETRIST

## 2024-08-12 PROCEDURE — 92015 DETERMINE REFRACTIVE STATE: CPT | Performed by: OPTOMETRIST

## 2024-08-12 ASSESSMENT — CONFRONTATIONAL VISUAL FIELD TEST (CVF)
OS_FINDINGS: FULL
OD_FINDINGS: FULL

## 2025-07-21 ENCOUNTER — OFFICE (OUTPATIENT)
Dept: URBAN - METROPOLITAN AREA CLINIC 12 | Facility: CLINIC | Age: 75
Setting detail: OPHTHALMOLOGY
End: 2025-07-21
Payer: MEDICARE

## 2025-07-21 DIAGNOSIS — H16.223: ICD-10-CM

## 2025-07-21 DIAGNOSIS — H50.00: ICD-10-CM

## 2025-07-21 DIAGNOSIS — H18.513: ICD-10-CM

## 2025-07-21 DIAGNOSIS — H40.013: ICD-10-CM

## 2025-07-21 PROBLEM — H52.7 REFRACTIVE ERROR: Status: ACTIVE | Noted: 2025-07-21

## 2025-07-21 PROCEDURE — 92250 FUNDUS PHOTOGRAPHY W/I&R: CPT | Performed by: OPTOMETRIST

## 2025-07-21 PROCEDURE — 92012 INTRM OPH EXAM EST PATIENT: CPT | Performed by: OPTOMETRIST

## 2025-07-21 ASSESSMENT — REFRACTION_CURRENTRX
OD_VPRISM_DIRECTION: SV
OS_VPRISM_DIRECTION: PROGS
OD_ADD: +3.00
OS_SPHERE: PLANO
OS_OVR_VA: 20/
OD_OVR_VA: 20/
OS_AXIS: 111
OD_SPHERE: PLANO
OD_ADD: +2.50
OS_ADD: +2.75
OS_OVR_VA: 20/
OD_CYLINDER: SPHERE
OS_VPRISM_DIRECTION: SV
OD_OVR_VA: 20/
OS_CYLINDER: -0.75
OS_ADD: +2.50
OD_VPRISM_DIRECTION: PROGS
OD_AXIS: 180

## 2025-07-21 ASSESSMENT — TEAR BREAK UP TIME (TBUT)
OS_TBUT: T
OD_TBUT: T

## 2025-07-21 ASSESSMENT — PACHYMETRY
OD_CT_CORRECTION: -6
OD_CT_UM: 622
OS_CT_CORRECTION: -6
OS_CT_UM: 625

## 2025-07-21 ASSESSMENT — REFRACTION_MANIFEST
OD_SPHERE: PLANO
OD_ADD: +3.00
OD_VA1: 20/30-
OS_ADD: +3.00
OS_VA1: 20/30-
OD_SPHERE: -0.25
OD_CYLINDER: SPH
OD_VA1: 20/25
OS_SPHERE: PLANO
OS_VA1: 20/25
OD_AXIS: 030
OS_SPHERE: +0.25
OS_AXIS: 110
OS_CYLINDER: -0.75
OD_CYLINDER: -0.50
OS_AXIS: 100
OS_CYLINDER: -0.75

## 2025-07-21 ASSESSMENT — REFRACTION_AUTOREFRACTION
OS_AXIS: 107
OS_SPHERE: +0.25
OD_CYLINDER: -0.25
OS_CYLINDER: -1.25
OD_AXIS: 054

## 2025-07-21 ASSESSMENT — KERATOMETRY
OS_K2POWER_DIOPTERS: 45.25
OS_K1POWER_DIOPTERS: 44.25
OD_AXISANGLE_DEGREES: 110
OD_K1POWER_DIOPTERS: 43.75
OD_K2POWER_DIOPTERS: 44.00
OS_AXISANGLE_DEGREES: 082

## 2025-07-21 ASSESSMENT — VISUAL ACUITY
OD_BCVA: 20/40-1
OS_BCVA: 20/25-1

## 2025-07-21 ASSESSMENT — CORNEAL DYSTROPHY - POSTERIOR
OS_POSTERIORDYSTROPHY: GUTTATA
OD_POSTERIORDYSTROPHY: GUTTATA

## 2025-07-21 ASSESSMENT — TONOMETRY
OS_IOP_MMHG: 16
OD_IOP_MMHG: 16